# Patient Record
Sex: MALE | Race: WHITE | NOT HISPANIC OR LATINO | Employment: OTHER | URBAN - METROPOLITAN AREA
[De-identification: names, ages, dates, MRNs, and addresses within clinical notes are randomized per-mention and may not be internally consistent; named-entity substitution may affect disease eponyms.]

---

## 2019-10-16 PROBLEM — I49.5 SSS (SICK SINUS SYNDROME): Status: ACTIVE | Noted: 2019-10-16

## 2022-12-14 PROBLEM — Z95.1 HX OF CABG: Status: ACTIVE | Noted: 2022-12-14

## 2022-12-14 PROBLEM — R00.1 BRADYCARDIA: Status: ACTIVE | Noted: 2022-12-14

## 2022-12-14 PROBLEM — I44.1 MOBITZ TYPE 1 SECOND DEGREE AV BLOCK: Status: ACTIVE | Noted: 2022-12-14

## 2022-12-14 PROBLEM — I51.9 LEFT VENTRICULAR SYSTOLIC DYSFUNCTION (LVSD) WITHOUT HEART FAILURE: Status: ACTIVE | Noted: 2022-12-14

## 2022-12-14 PROBLEM — I25.10 CAD IN NATIVE ARTERY: Status: ACTIVE | Noted: 2022-12-14

## 2023-04-17 ENCOUNTER — DOCUMENTATION ONLY (OUTPATIENT)
Dept: CARDIOLOGY | Facility: CLINIC | Age: 73
End: 2023-04-17

## 2023-04-17 NOTE — PROGRESS NOTES
Brief Referral Note:    Ref: Erick Turner    Cardiologist:  [facility]    Reason for referral: Short of breath, second opinion    Brief history:    Hasn't felt good since  his bypass surgery 10 years ago.  For the past few years he has been progressively SOB with less activity.  PPM in situ with recent generator change.  Coronary angiogram in 2022 at [facility].      Email:  [email or web address]    IMP:  WALLS post CABG.  DDX includes CAD, diastolic dysfunction, constriction, other    Rec:  Emailed him to send me records and then we will review them and see him in clinic.  Further evaluation PRN.

## 2023-05-15 ENCOUNTER — OFFICE VISIT (OUTPATIENT)
Dept: CARDIOLOGY | Facility: CLINIC | Age: 73
End: 2023-05-15
Payer: MEDICARE

## 2023-05-15 VITALS
DIASTOLIC BLOOD PRESSURE: 73 MMHG | BODY MASS INDEX: 35.04 KG/M2 | WEIGHT: 236.56 LBS | HEIGHT: 69 IN | HEART RATE: 66 BPM | SYSTOLIC BLOOD PRESSURE: 121 MMHG | OXYGEN SATURATION: 95 %

## 2023-05-15 DIAGNOSIS — I49.5 SSS (SICK SINUS SYNDROME): ICD-10-CM

## 2023-05-15 DIAGNOSIS — I51.9 LEFT VENTRICULAR SYSTOLIC DYSFUNCTION (LVSD) WITHOUT HEART FAILURE: ICD-10-CM

## 2023-05-15 DIAGNOSIS — I25.10 CAD IN NATIVE ARTERY: ICD-10-CM

## 2023-05-15 DIAGNOSIS — Z95.1 HX OF CABG: ICD-10-CM

## 2023-05-15 PROCEDURE — 1126F AMNT PAIN NOTED NONE PRSNT: CPT | Mod: CPTII,S$GLB,, | Performed by: INTERNAL MEDICINE

## 2023-05-15 PROCEDURE — 4010F PR ACE/ARB THEARPY RXD/TAKEN: ICD-10-PCS | Mod: CPTII,S$GLB,, | Performed by: INTERNAL MEDICINE

## 2023-05-15 PROCEDURE — 3074F SYST BP LT 130 MM HG: CPT | Mod: CPTII,S$GLB,, | Performed by: INTERNAL MEDICINE

## 2023-05-15 PROCEDURE — 4010F ACE/ARB THERAPY RXD/TAKEN: CPT | Mod: CPTII,S$GLB,, | Performed by: INTERNAL MEDICINE

## 2023-05-15 PROCEDURE — 3074F PR MOST RECENT SYSTOLIC BLOOD PRESSURE < 130 MM HG: ICD-10-PCS | Mod: CPTII,S$GLB,, | Performed by: INTERNAL MEDICINE

## 2023-05-15 PROCEDURE — 99204 PR OFFICE/OUTPT VISIT, NEW, LEVL IV, 45-59 MIN: ICD-10-PCS | Mod: S$GLB,,, | Performed by: INTERNAL MEDICINE

## 2023-05-15 PROCEDURE — 99999 PR PBB SHADOW E&M-EST. PATIENT-LVL IV: ICD-10-PCS | Mod: PBBFAC,,, | Performed by: INTERNAL MEDICINE

## 2023-05-15 PROCEDURE — 99999 PR PBB SHADOW E&M-EST. PATIENT-LVL IV: CPT | Mod: PBBFAC,,, | Performed by: INTERNAL MEDICINE

## 2023-05-15 PROCEDURE — 3008F BODY MASS INDEX DOCD: CPT | Mod: CPTII,S$GLB,, | Performed by: INTERNAL MEDICINE

## 2023-05-15 PROCEDURE — 1126F PR PAIN SEVERITY QUANTIFIED, NO PAIN PRESENT: ICD-10-PCS | Mod: CPTII,S$GLB,, | Performed by: INTERNAL MEDICINE

## 2023-05-15 PROCEDURE — 1159F MED LIST DOCD IN RCRD: CPT | Mod: CPTII,S$GLB,, | Performed by: INTERNAL MEDICINE

## 2023-05-15 PROCEDURE — 1159F PR MEDICATION LIST DOCUMENTED IN MEDICAL RECORD: ICD-10-PCS | Mod: CPTII,S$GLB,, | Performed by: INTERNAL MEDICINE

## 2023-05-15 PROCEDURE — 3008F PR BODY MASS INDEX (BMI) DOCUMENTED: ICD-10-PCS | Mod: CPTII,S$GLB,, | Performed by: INTERNAL MEDICINE

## 2023-05-15 PROCEDURE — 3078F PR MOST RECENT DIASTOLIC BLOOD PRESSURE < 80 MM HG: ICD-10-PCS | Mod: CPTII,S$GLB,, | Performed by: INTERNAL MEDICINE

## 2023-05-15 PROCEDURE — 3078F DIAST BP <80 MM HG: CPT | Mod: CPTII,S$GLB,, | Performed by: INTERNAL MEDICINE

## 2023-05-15 PROCEDURE — 99204 OFFICE O/P NEW MOD 45 MIN: CPT | Mod: S$GLB,,, | Performed by: INTERNAL MEDICINE

## 2023-05-15 RX ORDER — OLMESARTAN MEDOXOMIL 20 MG/1
20 TABLET ORAL DAILY
Qty: 90 TABLET | Refills: 3 | Status: SHIPPED | OUTPATIENT
Start: 2023-05-15 | End: 2023-05-25 | Stop reason: SDUPTHER

## 2023-05-15 RX ORDER — CLOPIDOGREL BISULFATE 75 MG/1
TABLET ORAL
Qty: 30 TABLET | Refills: 11 | Status: SHIPPED | OUTPATIENT
Start: 2023-05-15 | End: 2023-05-15 | Stop reason: CLARIF

## 2023-05-15 RX ORDER — HYDROCHLOROTHIAZIDE 12.5 MG/1
12.5 TABLET ORAL DAILY
Qty: 30 TABLET | Refills: 11 | Status: ON HOLD | OUTPATIENT
Start: 2023-05-15 | End: 2023-09-05 | Stop reason: HOSPADM
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
## 2023-05-25 DIAGNOSIS — I49.5 SSS (SICK SINUS SYNDROME): Primary | ICD-10-CM

## 2023-05-25 DIAGNOSIS — I25.10 CAD IN NATIVE ARTERY: ICD-10-CM

## 2023-05-25 RX ORDER — OLMESARTAN MEDOXOMIL 20 MG/1
20 TABLET ORAL DAILY
Qty: 90 TABLET | Refills: 3 | Status: ON HOLD | OUTPATIENT
Start: 2023-05-25 | End: 2023-09-05 | Stop reason: HOSPADM

## 2023-06-13 ENCOUNTER — PATIENT MESSAGE (OUTPATIENT)
Dept: CARDIOLOGY | Facility: CLINIC | Age: 73
End: 2023-06-13

## 2023-06-13 ENCOUNTER — TELEPHONE (OUTPATIENT)
Dept: CARDIOLOGY | Facility: CLINIC | Age: 73
End: 2023-06-13

## 2023-06-13 NOTE — TELEPHONE ENCOUNTER
Patient called to report that after three weeks of Hydrochlorothiazide he was advised to stop it by Dr. Molina.  He became very dehydrated, dizzy, experiencing headaches and his BUN/Cr levels increased.  He still c/o SOB with exertion.  He had lab work and echo done last week at [facility] and will have them fax over a copy of results.  He also has blood work scheduled tomorrow and a follow up with  next week.  Will keep virtual visit with Dr. Koch on 6/28 as scheduled.  He did not want to move up appointment due to upcoming labs and Follow up at [facility]>

## 2023-06-14 ENCOUNTER — PATIENT MESSAGE (OUTPATIENT)
Dept: CARDIOLOGY | Facility: CLINIC | Age: 73
End: 2023-06-14

## 2023-06-16 ENCOUNTER — PATIENT MESSAGE (OUTPATIENT)
Dept: CARDIOLOGY | Facility: CLINIC | Age: 73
End: 2023-06-16

## 2023-06-16 ENCOUNTER — EDUCATION (OUTPATIENT)
Dept: CARDIOLOGY | Facility: CLINIC | Age: 73
End: 2023-06-16

## 2023-06-16 ENCOUNTER — DOCUMENTATION ONLY (OUTPATIENT)
Dept: CARDIOLOGY | Facility: CLINIC | Age: 73
End: 2023-06-16

## 2023-06-16 DIAGNOSIS — N18.9 CHRONIC KIDNEY DISEASE, UNSPECIFIED CKD STAGE: ICD-10-CM

## 2023-06-16 DIAGNOSIS — I25.10 CORONARY ARTERY DISEASE INVOLVING NATIVE HEART, UNSPECIFIED VESSEL OR LESION TYPE, UNSPECIFIED WHETHER ANGINA PRESENT: Primary | ICD-10-CM

## 2023-06-16 RX ORDER — PRASUGREL 10 MG/1
TABLET, FILM COATED ORAL
Qty: 30 TABLET | Refills: 11 | Status: ON HOLD | OUTPATIENT
Start: 2023-06-16 | End: 2023-06-30 | Stop reason: HOSPADM

## 2023-06-16 RX ORDER — SODIUM CHLORIDE 9 MG/ML
INJECTION, SOLUTION INTRAVENOUS CONTINUOUS
Status: CANCELLED | OUTPATIENT
Start: 2023-06-16 | End: 2023-06-16

## 2023-06-16 RX ORDER — SODIUM CHLORIDE 0.9 % (FLUSH) 0.9 %
10 SYRINGE (ML) INJECTION
Status: CANCELLED | OUTPATIENT
Start: 2023-06-16

## 2023-06-16 RX ORDER — DIPHENHYDRAMINE HCL 50 MG
50 CAPSULE ORAL ONCE
Status: CANCELLED | OUTPATIENT
Start: 2023-06-16 | End: 2023-06-16

## 2023-06-16 NOTE — PROGRESS NOTES
Patient with incomplete revascularization by CABG with persistent WALLS and chest discomfort despite ARB and wt loss.  I have reviewed his chart and films in [employer] (under his name).  Will bring him in for ostial LAD to very large FSP and diagonal PCI as well as possible OMB and mid and distal LAD PCI.  Needs Prasugrel and ASA.  LV to schedule.  Consents already signed in clinic.

## 2023-06-16 NOTE — PROGRESS NOTES
OUTPATIENT CATHETERIZATION INSTRUCTIONS    You have been scheduled for a procedure in the catheterization lab on Friday, June 30, 2023.     Please report to the Cardiology Waiting Area on the Third floor of the hospital and check in at 6 AM.   You will then be taken to the SSCU (Short Stay Cardiac Unit) and prepared for your procedure. Please be aware that this is not the time of your procedure but the time you are to arrive. The procedures are scheduled on an hourly basis; however, emergency cases take precedence over all other cases.       No solid foods 8 hours prior to your procedure.  You may have clear liquids until the time of your admission which should be 2 hours prior to your procedure.  You are encouraged to drink at least 8 ounces of clear liquids prior to your admission to SSCU.  Patients with gastric emptying issues should be fasting for 6- 8 hours prior to the procedure.  Clear liquids include water, black coffee, clear juices, and performance drinks - no pulp or milk.    Heart failure or dialysis patients will be limited to 8 ounces (1 cup) of clear liquids up until 2 hours of the procedure.    3.   You may take your regular morning medications with water. If there are any medications that you should not take you will be instructed to hold them that morning. If you         are diabetic and on Metformin (Glucophage) do not take it the day before, the day of, and for 2 days after your procedure.  4.   If you are on Pradaxa, Eliquis or Coumadin , you can hold them 3 days prior to your procedure.  Do not stop your Aspirin, Plavix, Effient, or Brilinta.      The procedure will take 1-2 hours to perform. After the procedure, you will return to SSCU on the third floor of the hospital. You will need to lie still (or keep your arm still) for the next 4 to 6 hours to minimize bleeding from the puncture site. Your family may remain in the room with you during this time.       You may be able to be discharged  home that same afternoon if there is someone to drive you home and there were no complications. If you have one of the balloon, stent, or device procedures you may spend the night in the hospital. Your doctor will determine, based on your progress, the date and time of your discharge. The results of your procedure will be discussed with you before you are discharged. Any further testing or procedures will be scheduled for you either before you leave or you will be called with these appointments.       If you should have any questions, concerns, or need to change the date of your procedure, please call  Kelly RN @ [phone]            Special Instructions:  Continue on aspirin  Take 6 Prasugrel tablets(60 mg) the night before then 10 mg daily        THE ABOVE INSTRUCTIONS WERE GIVEN TO THE PATIENT VERBALLY AND THEY VERBALIZED UNDERSTANDING.  THEY DO NOT REQUIRE ANY SPECIAL NEEDS AND DO NOT HAVE ANY LEARNING BARRIERS.          Directions for Reporting to Cardiology Waiting Area in the Hospital  If you park in the Parking Garage:  Take elevators to the1st floor of the parking garage.  Continue past the gift shop, coffee shop, and piano.  Take a right and go to the gold elevators. (Elevator B)  Take the elevator to the 3rd floor.  Follow the arrow on the sign on the wall that says Cath Lab Registration/EP Lab Registration.  Follow the long hallway all the way around until you come to a big open area.  This is the registration area.  Check in at Reception Desk.    OR    If family is dropping you off:  Have them drop you off at the front of the Hospital under the green overhang.  Enter through the doors and take a right.  Take the E elevators to the 3rd floor Cardiology Waiting Area.  Check in at the Reception Desk in the waiting room.

## 2023-06-28 ENCOUNTER — OFFICE VISIT (OUTPATIENT)
Dept: CARDIOLOGY | Facility: CLINIC | Age: 73
End: 2023-06-28
Payer: MEDICARE

## 2023-06-28 VITALS — WEIGHT: 224 LBS | HEIGHT: 69 IN | BODY MASS INDEX: 33.18 KG/M2

## 2023-06-28 DIAGNOSIS — I25.10 CAD IN NATIVE ARTERY: Primary | ICD-10-CM

## 2023-06-28 PROCEDURE — 99214 OFFICE O/P EST MOD 30 MIN: CPT | Mod: 95,,, | Performed by: INTERNAL MEDICINE

## 2023-06-28 PROCEDURE — 1159F PR MEDICATION LIST DOCUMENTED IN MEDICAL RECORD: ICD-10-PCS | Mod: CPTII,95,, | Performed by: INTERNAL MEDICINE

## 2023-06-28 PROCEDURE — 1126F AMNT PAIN NOTED NONE PRSNT: CPT | Mod: CPTII,95,, | Performed by: INTERNAL MEDICINE

## 2023-06-28 PROCEDURE — 99214 PR OFFICE/OUTPT VISIT, EST, LEVL IV, 30-39 MIN: ICD-10-PCS | Mod: 95,,, | Performed by: INTERNAL MEDICINE

## 2023-06-28 PROCEDURE — 1126F PR PAIN SEVERITY QUANTIFIED, NO PAIN PRESENT: ICD-10-PCS | Mod: CPTII,95,, | Performed by: INTERNAL MEDICINE

## 2023-06-28 PROCEDURE — 1159F MED LIST DOCD IN RCRD: CPT | Mod: CPTII,95,, | Performed by: INTERNAL MEDICINE

## 2023-06-28 PROCEDURE — 4010F ACE/ARB THERAPY RXD/TAKEN: CPT | Mod: CPTII,95,, | Performed by: INTERNAL MEDICINE

## 2023-06-28 PROCEDURE — 4010F PR ACE/ARB THEARPY RXD/TAKEN: ICD-10-PCS | Mod: CPTII,95,, | Performed by: INTERNAL MEDICINE

## 2023-06-28 PROCEDURE — 3008F PR BODY MASS INDEX (BMI) DOCUMENTED: ICD-10-PCS | Mod: CPTII,95,, | Performed by: INTERNAL MEDICINE

## 2023-06-28 PROCEDURE — 3008F BODY MASS INDEX DOCD: CPT | Mod: CPTII,95,, | Performed by: INTERNAL MEDICINE

## 2023-06-28 NOTE — PROGRESS NOTES
PCP - David Casey MD  Subjective:   Patient ID:  Rasta Caraballo is a 72 y.o. male who presents for  evaluation of Coronary Artery Disease.     Referring provider: David Casey MD  Primary cardiologist: Devon Molina MD    The following visit is a virtual visit due:  Patient location: Home  Visit type: Virtual visit with realtime audio and video.  Total time spent with patient: 35 min with more than 50% of this time being spent on direct counseling and coordination of care.     Each patient to whom he or she provides medical services by telemedicine is:  (1) informed of the relationship between the physician and patient and the respective role of any other health care provider with respect to management of the patient; and (2) notified that he or she may decline to receive medical services by telemedicine and may withdraw from such care at any time.      HPI: Rasta Caraballo is a 72 y.o. M with CAD s/p CABG in 2013 (LIMA-LAD, SVG-PDA), chronic systolic HF (EF 45% from 60%), HTN, HLD, SSS s/p PPM and now CRT-P upgrade who is referred for evaluation of coronary artery disease.     Patient reports chronic dyspnea on exertion since [place]. Symptoms have been progressively worse. He also reports intermittent chest pressure that resolves with rest. He underwent a stress test (?PET) which was reportedly abnormal - records not available. And subsequently underwent a coronary angiogram in 12/2022 with Dr Molina. This showed patent LIMA-LAD and SVG-PDA. However he has high grade ostial LAD stenosis just before a large diagonal branch that is not bypassed as well as intermediate stenosis of OMB and mid RCA. He was referred to us for second opinion regarding coronary stenting.     Interval History:  Since we last saw him, he is still limited by WALLS and chest pain.  He has been having orthostatic hypotension on olmesartan. Ready for angiography and possible intervention.    History:     Past Medical History:    Diagnosis Date    A-fib     AFTER BYPASS    Arthritis     Bradycardia     Cancer 10/08/2019    prostate    Carotid artery stenosis     CKD (chronic kidney disease) stage 3, GFR 30-59 ml/min     Coronary artery disease     Depression     AFTER BACK SX    Diabetes     pre diabetic    Encounter for blood transfusion     Function kidney decreased     High cholesterol     History of 2019 novel coronavirus disease (COVID-19)     HTN (hypertension)     Irregular heartbeat     MI (myocardial infarction)     Personal history of colonic polyps     Renal disorder      Past Surgical History:   Procedure Laterality Date    COLONOSCOPY W/ BIOPSIES AND POLYPECTOMY      CORONARY ANGIOPLASTY WITH STENT PLACEMENT      Double bypass      Dr Pawel Mcclain    IMPLANTATION OF BIVENTRICULAR HEART PACEMAKER      INSERTION OF BIVENTRICULAR IMPLANTABLE CARDIOVERTER-DEFIBRILLATOR (ICD) N/A 2022    Procedure: INSERTION, ICD, BIVENTRICULAR;  Surgeon: Devon Molina MD;  Location: [facility] CATH;  Service: Cardiology;  Laterality: N/A;    LEFT HEART CATHETERIZATION Left 2022    Procedure: Left heart cath;  Surgeon: Devon Molina MD;  Location: [facility] CATH;  Service: Cardiology;  Laterality: Left;    LUMBAR FUSION      Dr Popeye BARRIENTOS L5-L6    LUMBAR FUSION  2011    L2-L3    PROSTATE BIOPSY      ROTATOR CUFF REPAIR Right 2007    DR Maco Amezquita    ROTATOR CUFF REPAIR Left 2009    TOTAL HIP ARTHROPLASTY Right 2014    TOTAL KNEE ARTHROPLASTY Left      Social History     Tobacco Use    Smoking status: Former     Types: Cigarettes     Quit date: 1991     Years since quittin.4    Smokeless tobacco: Never   Substance Use Topics    Alcohol use: Yes     Alcohol/week: 0.0 standard drinks     Comment: occasionally     Family History   Problem Relation Age of Onset    Diabetes Mother     Heart disease Mother     Cancer Father         stomach & prostate    Heart attack Brother        Meds:   Review of patient's  allergies indicates:  No Known Allergies    Current Outpatient Medications:     acetaminophen (TYLENOL) 500 MG tablet, Take 500 mg by mouth every 6 (six) hours as needed for Pain., Disp: , Rfl:     aspirin 81 MG Chew, Take 81 mg by mouth once daily., Disp: , Rfl:     carvediloL (COREG) 6.25 MG tablet, Take 6.25 mg by mouth 2 (two) times daily with meals., Disp: , Rfl:     cholecalciferol, vitamin D3, (VITAMIN D3) 50 mcg (2,000 unit) Cap capsule, Take 1 capsule by mouth once daily., Disp: , Rfl:     furosemide (LASIX) 20 MG tablet, Take 20 mg by mouth once daily., Disp: , Rfl:     hydroCHLOROthiazide (HYDRODIURIL) 12.5 MG Tab, Take 1 tablet (12.5 mg total) by mouth once daily., Disp: 30 tablet, Rfl: 11    magnesium oxide (MAG-OX) 400 mg (241.3 mg magnesium) tablet, Take 400 mg by mouth once daily., Disp: , Rfl:     metFORMIN (GLUCOPHAGE) 500 MG tablet, Take 1 tablet (500 mg total) by mouth 2 (two) times daily with meals., Disp: , Rfl:     nitroGLYCERIN (NITROSTAT) 0.4 MG SL tablet, Place 0.4 mg under the tongue every 5 (five) minutes as needed., Disp: , Rfl:     olmesartan (BENICAR) 20 MG tablet, Take 1 tablet (20 mg total) by mouth once daily., Disp: 90 tablet, Rfl: 3    pantoprazole (PROTONIX) 40 MG tablet, Take 40 mg by mouth once daily., Disp: , Rfl:     prasugreL (EFFIENT) 10 mg Tab, Take 6 tablets (60 mg) night before procedure then 1 tab (10 mg) daily starting day of the procedure., Disp: 30 tablet, Rfl: 11    rosuvastatin (CRESTOR) 40 MG Tab, Take 1 tablet (40 mg total) by mouth every evening. Patient home dose, Disp: , Rfl:     vardenafil (LEVITRA) 10 MG tablet, Take 10 mg by mouth daily as needed., Disp: , Rfl:     zinc gluconate 50 mg tablet, Take 50 mg by mouth once daily., Disp: , Rfl:     zolpidem (AMBIEN) 10 mg Tab, Take 1 tablet by mouth nightly as needed., Disp: , Rfl: 0      Review of Systems   Constitutional:  Negative for chills, diaphoresis, fever, malaise/fatigue and weight loss.   HENT:  Negative.     Eyes:  Negative for blurred vision, double vision, pain and redness.   Respiratory:  Positive for shortness of breath. Negative for cough and wheezing.    Cardiovascular:  Negative for chest pain, palpitations, orthopnea, claudication, leg swelling and PND.   Gastrointestinal:  Negative for abdominal pain, constipation, diarrhea, nausea and vomiting.   Genitourinary: Negative.    Musculoskeletal: Negative.    Skin: Negative.    Neurological:  Negative for dizziness, focal weakness, seizures, loss of consciousness, weakness and headaches.   Endo/Heme/Allergies: Negative.    Psychiatric/Behavioral:  Negative for depression. The patient is not nervous/anxious.      Objective:   There were no vitals taken for this visit.  Physical Exam  Constitutional:       General: He is not in acute distress.  HENT:      Head: Normocephalic and atraumatic.   Eyes:      Conjunctiva/sclera: Conjunctivae normal.      Pupils: Pupils are equal, round, and reactive to light.   Neck:      Vascular: No JVD.   Cardiovascular:      Rate and Rhythm: Normal rate and regular rhythm.      Pulses:           Radial pulses are 2+ on the right side and 2+ on the left side.      Heart sounds: Normal heart sounds. No murmur heard.    No friction rub. No gallop.   Pulmonary:      Effort: Pulmonary effort is normal. No respiratory distress.      Breath sounds: Normal breath sounds. No wheezing or rales.   Chest:      Chest wall: No tenderness.   Abdominal:      General: Bowel sounds are normal. There is no distension.      Palpations: Abdomen is soft.      Tenderness: There is no abdominal tenderness.   Musculoskeletal:         General: Normal range of motion.      Cervical back: Normal range of motion and neck supple.   Skin:     General: Skin is warm and dry.      Findings: No erythema.   Neurological:      Mental Status: He is alert and oriented to person, place, and time.   Psychiatric:         Mood and Affect: Mood and affect normal.          Cognition and Memory: Memory normal.         Judgment: Judgment normal.       Labs:     Lab Results   Component Value Date     06/24/2009    K 5.1 06/24/2009     06/24/2009    CO2 27 06/24/2009    BUN 24 (H) 06/24/2009    CREATININE 1.2 06/24/2009     No results found for: HGBA1C  No results found for: BNP, BNPTRIAGEBLO    Lab Results   Component Value Date    WBC 6.56 06/24/2009    HGB 14.6 06/24/2009    HCT 44.7 06/24/2009     06/24/2009    GRAN 3.9 06/24/2009    GRAN 59.2 06/24/2009     Lab Results   Component Value Date    CHOL 200 (H) 06/24/2009    HDL 44 06/24/2009    LDLCALC 127.2 06/24/2009    TRIG 144 06/24/2009       Lab Results   Component Value Date     06/24/2009    K 5.1 06/24/2009     06/24/2009    CO2 27 06/24/2009    BUN 24 (H) 06/24/2009    CREATININE 1.2 06/24/2009     No results found for: HGBA1C  No results found for: BNP, BNPTRIAGEBLO Lab Results   Component Value Date    WBC 6.56 06/24/2009    HGB 14.6 06/24/2009    HCT 44.7 06/24/2009     06/24/2009    GRAN 3.9 06/24/2009    GRAN 59.2 06/24/2009     Lab Results   Component Value Date    CHOL 200 (H) 06/24/2009    HDL 44 06/24/2009    LDLCALC 127.2 06/24/2009    TRIG 144 06/24/2009              Assessment & Plan:     1- CAD in native artery with Hx of CABG  - Rasta LORI Caraballo is a 72 y.o. M with CAD s/p CABG in 2013 (LIMA-LAD and SVG-PDA) who is referred for evaluation of chronic dyspnea on exertion. Patient underwent coronary angiogram in 12/2022 that showed patent grafts and high grade ostial LAD stenosis at large diagonal branch that is not bypassed and intermediate mid RCA and OMB stenosis. Recommend optimizing medical therapy and lifestyle modification including weight loss. Started olmesartan a month ago.  He has an angiogram under imaging, xray other, which shows borderline obstructive mid RCA stenosis, ostial LAD stenosis, and proximal OMB1 stenosis which may be contributing to his symptoms.   His PDA and LAD are bypassed but there are some proximal or distal vessels that could benefit from PCI.  His symptoms have persisted despite treatment of metabolic syndrome.  Since his symptoms have not improved, will consider angiography with IFR of the ostial LAD, OMB, and mid RCA.  He also has mild AS/AI.    3- Left ventricular systolic dysfunction (LVSD) without heart failure  - .Continue with GDMT and low sodium diet.     4- Hypertension   - lmesartan 20 mg po daily but didn't tolerate HCTZ. .    5- Hyperlipidemia  - Continue Crestor 40 mg po daily    6- SSS (sick sinus syndrome)  - Patient has hx SSS s/p PPM, recently underwent CRT-P upgrade with Dr Molina     7- Obesity  - There is no height or weight on file to calculate BMI. Weight loss encouraged through diet and exercise    8. VHD.  Bicuspid AV with mild AI and normal EF by echo in [place] in 5/2023.  Needs f/u.  Plan:    Radial or femoral access depending on body habitus Friday.  Load with DAP the night before.  Check PRU Friday AM.  Change DAP PRN.  IFR of the Ostial LAD, OMB, and mid RCA.  Reviewed TTE which he says was sent on June 8: May 2023 normal EF, Bicuspid AV with mild AI on epic under images.  May want to repeat here in the future.     Addendum June 29:  Cines reviewed from 12/22  in [place].  Patient appears to have complex distal LM stenosis involving the ostial LAD and the unprotected LCX.  The LIMA to LAD is patent but doesn't supply proximal LAD, Large FSP, or diagonal.  The mid RCA has a suspicious lesion as well.

## 2023-06-30 ENCOUNTER — HOSPITAL ENCOUNTER (OUTPATIENT)
Facility: HOSPITAL | Age: 73
Discharge: HOME | End: 2023-06-30
Attending: INTERNAL MEDICINE | Admitting: INTERNAL MEDICINE
Payer: MEDICARE

## 2023-06-30 VITALS
TEMPERATURE: 98 F | RESPIRATION RATE: 14 BRPM | OXYGEN SATURATION: 100 % | SYSTOLIC BLOOD PRESSURE: 136 MMHG | HEIGHT: 69 IN | WEIGHT: 225 LBS | DIASTOLIC BLOOD PRESSURE: 60 MMHG | BODY MASS INDEX: 33.33 KG/M2 | HEART RATE: 75 BPM

## 2023-06-30 DIAGNOSIS — I25.10 CORONARY ARTERY DISEASE INVOLVING NATIVE HEART, UNSPECIFIED VESSEL OR LESION TYPE, UNSPECIFIED WHETHER ANGINA PRESENT: ICD-10-CM

## 2023-06-30 DIAGNOSIS — N18.9 CHRONIC KIDNEY DISEASE, UNSPECIFIED CKD STAGE: ICD-10-CM

## 2023-06-30 DIAGNOSIS — I25.10 CAD (CORONARY ARTERY DISEASE): ICD-10-CM

## 2023-06-30 PROBLEM — I25.118 CORONARY ARTERY DISEASE OF NATIVE ARTERY OF NATIVE HEART WITH STABLE ANGINA PECTORIS: Status: ACTIVE | Noted: 2023-06-30

## 2023-06-30 LAB
ABO + RH BLD: NORMAL
ANION GAP SERPL CALC-SCNC: 14 MMOL/L (ref 8–16)
APTT PPP: 26.7 SEC (ref 21–32)
BASOPHILS # BLD AUTO: 0.03 K/UL (ref 0–0.2)
BASOPHILS NFR BLD: 0.3 % (ref 0–1.9)
BLD GP AB SCN CELLS X3 SERPL QL: NORMAL
BUN SERPL-MCNC: 31 MG/DL (ref 8–23)
CALCIUM SERPL-MCNC: 9.9 MG/DL (ref 8.7–10.5)
CHLORIDE SERPL-SCNC: 102 MMOL/L (ref 95–110)
CO2 SERPL-SCNC: 23 MMOL/L (ref 23–29)
CREAT SERPL-MCNC: 1.6 MG/DL (ref 0.5–1.4)
DIFFERENTIAL METHOD: ABNORMAL
EOSINOPHIL # BLD AUTO: 0.1 K/UL (ref 0–0.5)
EOSINOPHIL NFR BLD: 1.3 % (ref 0–8)
ERYTHROCYTE [DISTWIDTH] IN BLOOD BY AUTOMATED COUNT: 18.8 % (ref 11.5–14.5)
EST. GFR  (NO RACE VARIABLE): 45.8 ML/MIN/1.73 M^2
GLUCOSE SERPL-MCNC: 114 MG/DL (ref 70–110)
HCT VFR BLD AUTO: 37.7 % (ref 40–54)
HGB BLD-MCNC: 12 G/DL (ref 14–18)
IMM GRANULOCYTES # BLD AUTO: 0.03 K/UL (ref 0–0.04)
IMM GRANULOCYTES NFR BLD AUTO: 0.3 % (ref 0–0.5)
INR PPP: 1.1 (ref 0.8–1.2)
LYMPHOCYTES # BLD AUTO: 2 K/UL (ref 1–4.8)
LYMPHOCYTES NFR BLD: 20.3 % (ref 18–48)
MCH RBC QN AUTO: 25.2 PG (ref 27–31)
MCHC RBC AUTO-ENTMCNC: 31.8 G/DL (ref 32–36)
MCV RBC AUTO: 79 FL (ref 82–98)
MONOCYTES # BLD AUTO: 0.8 K/UL (ref 0.3–1)
MONOCYTES NFR BLD: 8.7 % (ref 4–15)
NEUTROPHILS # BLD AUTO: 6.7 K/UL (ref 1.8–7.7)
NEUTROPHILS NFR BLD: 69.1 % (ref 38–73)
NRBC BLD-RTO: 0 /100 WBC
PLATELET # BLD AUTO: 258 K/UL (ref 150–450)
PMV BLD AUTO: 10.1 FL (ref 9.2–12.9)
POCT GLUCOSE: 109 MG/DL (ref 70–110)
POTASSIUM SERPL-SCNC: 4.1 MMOL/L (ref 3.5–5.1)
PROTHROMBIN TIME: 11.4 SEC (ref 9–12.5)
RBC # BLD AUTO: 4.76 M/UL (ref 4.6–6.2)
SODIUM SERPL-SCNC: 139 MMOL/L (ref 136–145)
SPECIMEN OUTDATE: NORMAL
WBC # BLD AUTO: 9.67 K/UL (ref 3.9–12.7)

## 2023-06-30 PROCEDURE — 25500020 PHARM REV CODE 255: Performed by: INTERNAL MEDICINE

## 2023-06-30 PROCEDURE — 82962 GLUCOSE BLOOD TEST: CPT | Performed by: INTERNAL MEDICINE

## 2023-06-30 PROCEDURE — 27201423 OPTIME MED/SURG SUP & DEVICES STERILE SUPPLY: Performed by: INTERNAL MEDICINE

## 2023-06-30 PROCEDURE — 36415 COLL VENOUS BLD VENIPUNCTURE: CPT | Performed by: INTERNAL MEDICINE

## 2023-06-30 PROCEDURE — 93010 ELECTROCARDIOGRAM REPORT: CPT | Mod: ,,, | Performed by: INTERNAL MEDICINE

## 2023-06-30 PROCEDURE — 85730 THROMBOPLASTIN TIME PARTIAL: CPT | Performed by: INTERNAL MEDICINE

## 2023-06-30 PROCEDURE — 85025 COMPLETE CBC W/AUTO DIFF WBC: CPT | Performed by: INTERNAL MEDICINE

## 2023-06-30 PROCEDURE — 85610 PROTHROMBIN TIME: CPT | Performed by: INTERNAL MEDICINE

## 2023-06-30 PROCEDURE — 93454 CORONARY ARTERY ANGIO S&I: CPT | Mod: 59 | Performed by: INTERNAL MEDICINE

## 2023-06-30 PROCEDURE — 99152 MOD SED SAME PHYS/QHP 5/>YRS: CPT | Mod: ,,, | Performed by: INTERNAL MEDICINE

## 2023-06-30 PROCEDURE — 93010 EKG 12-LEAD: ICD-10-PCS | Mod: ,,, | Performed by: INTERNAL MEDICINE

## 2023-06-30 PROCEDURE — 99152 MOD SED SAME PHYS/QHP 5/>YRS: CPT | Performed by: INTERNAL MEDICINE

## 2023-06-30 PROCEDURE — C1887 CATHETER, GUIDING: HCPCS | Performed by: INTERNAL MEDICINE

## 2023-06-30 PROCEDURE — 93005 ELECTROCARDIOGRAM TRACING: CPT

## 2023-06-30 PROCEDURE — 92928 PRQ TCAT PLMT NTRAC ST 1 LES: CPT | Mod: LD,,, | Performed by: INTERNAL MEDICINE

## 2023-06-30 PROCEDURE — 93454 CORONARY ARTERY ANGIO S&I: CPT | Mod: 26,59,, | Performed by: INTERNAL MEDICINE

## 2023-06-30 PROCEDURE — C1874 STENT, COATED/COV W/DEL SYS: HCPCS | Performed by: INTERNAL MEDICINE

## 2023-06-30 PROCEDURE — 92928 PR STENT: ICD-10-PCS | Mod: LD,,, | Performed by: INTERNAL MEDICINE

## 2023-06-30 PROCEDURE — C1760 CLOSURE DEV, VASC: HCPCS | Performed by: INTERNAL MEDICINE

## 2023-06-30 PROCEDURE — 86900 BLOOD TYPING SEROLOGIC ABO: CPT | Performed by: INTERNAL MEDICINE

## 2023-06-30 PROCEDURE — C1894 INTRO/SHEATH, NON-LASER: HCPCS | Performed by: INTERNAL MEDICINE

## 2023-06-30 PROCEDURE — 99153 MOD SED SAME PHYS/QHP EA: CPT | Performed by: INTERNAL MEDICINE

## 2023-06-30 PROCEDURE — C1769 GUIDE WIRE: HCPCS | Performed by: INTERNAL MEDICINE

## 2023-06-30 PROCEDURE — C1725 CATH, TRANSLUMIN NON-LASER: HCPCS | Performed by: INTERNAL MEDICINE

## 2023-06-30 PROCEDURE — 99152 PR MOD CONSCIOUS SEDATION, SAME PHYS, 5+ YRS, FIRST 15 MIN: ICD-10-PCS | Mod: ,,, | Performed by: INTERNAL MEDICINE

## 2023-06-30 PROCEDURE — 25000003 PHARM REV CODE 250: Performed by: INTERNAL MEDICINE

## 2023-06-30 PROCEDURE — 80048 BASIC METABOLIC PNL TOTAL CA: CPT | Performed by: INTERNAL MEDICINE

## 2023-06-30 PROCEDURE — 93454 PR CATH PLACE/CORONARY ANGIO, IMG SUPER/INTERP: ICD-10-PCS | Mod: 26,59,, | Performed by: INTERNAL MEDICINE

## 2023-06-30 PROCEDURE — 63600175 PHARM REV CODE 636 W HCPCS: Performed by: INTERNAL MEDICINE

## 2023-06-30 PROCEDURE — C9600 PERC DRUG-EL COR STENT SING: HCPCS | Mod: LD | Performed by: INTERNAL MEDICINE

## 2023-06-30 DEVICE — EVEROLIMUS-ELUTING PLATINUM CHROMIUM CORONARY STENT SYSTEM
Type: IMPLANTABLE DEVICE | Site: HEART | Status: FUNCTIONAL
Brand: SYNERGY™ XD

## 2023-06-30 RX ORDER — ACETAMINOPHEN 325 MG/1
650 TABLET ORAL EVERY 4 HOURS PRN
Status: DISCONTINUED | OUTPATIENT
Start: 2023-06-30 | End: 2023-06-30 | Stop reason: HOSPADM

## 2023-06-30 RX ORDER — SODIUM CHLORIDE 9 MG/ML
INJECTION, SOLUTION INTRAVENOUS CONTINUOUS
Status: ACTIVE | OUTPATIENT
Start: 2023-06-30 | End: 2023-06-30

## 2023-06-30 RX ORDER — PRASUGREL 10 MG/1
10 TABLET, FILM COATED ORAL DAILY
Qty: 30 TABLET | Refills: 11 | Status: SHIPPED | OUTPATIENT
Start: 2023-06-30 | End: 2023-07-05 | Stop reason: SDUPTHER

## 2023-06-30 RX ORDER — MIDAZOLAM HYDROCHLORIDE 1 MG/ML
INJECTION, SOLUTION INTRAMUSCULAR; INTRAVENOUS
Status: DISCONTINUED | OUTPATIENT
Start: 2023-06-30 | End: 2023-06-30 | Stop reason: HOSPADM

## 2023-06-30 RX ORDER — ONDANSETRON 8 MG/1
8 TABLET, ORALLY DISINTEGRATING ORAL EVERY 8 HOURS PRN
Status: DISCONTINUED | OUTPATIENT
Start: 2023-06-30 | End: 2023-06-30 | Stop reason: HOSPADM

## 2023-06-30 RX ORDER — HEPARIN SODIUM 1000 [USP'U]/ML
INJECTION, SOLUTION INTRAVENOUS; SUBCUTANEOUS
Status: DISCONTINUED | OUTPATIENT
Start: 2023-06-30 | End: 2023-06-30 | Stop reason: HOSPADM

## 2023-06-30 RX ORDER — SODIUM CHLORIDE 0.9 % (FLUSH) 0.9 %
10 SYRINGE (ML) INJECTION
Status: DISCONTINUED | OUTPATIENT
Start: 2023-06-30 | End: 2023-06-30 | Stop reason: HOSPADM

## 2023-06-30 RX ORDER — FENTANYL CITRATE 50 UG/ML
INJECTION, SOLUTION INTRAMUSCULAR; INTRAVENOUS
Status: DISCONTINUED | OUTPATIENT
Start: 2023-06-30 | End: 2023-06-30 | Stop reason: HOSPADM

## 2023-06-30 RX ORDER — CEFAZOLIN SODIUM 1 G/3ML
INJECTION, POWDER, FOR SOLUTION INTRAMUSCULAR; INTRAVENOUS
Status: DISCONTINUED | OUTPATIENT
Start: 2023-06-30 | End: 2023-06-30 | Stop reason: HOSPADM

## 2023-06-30 RX ORDER — DIPHENHYDRAMINE HCL 50 MG
50 CAPSULE ORAL ONCE
Status: COMPLETED | OUTPATIENT
Start: 2023-06-30 | End: 2023-06-30

## 2023-06-30 RX ORDER — HEPARIN SOD,PORCINE/0.9 % NACL 1000/500ML
INTRAVENOUS SOLUTION INTRAVENOUS
Status: DISCONTINUED | OUTPATIENT
Start: 2023-06-30 | End: 2023-06-30 | Stop reason: HOSPADM

## 2023-06-30 RX ORDER — LIDOCAINE HYDROCHLORIDE 10 MG/ML
INJECTION INFILTRATION; PERINEURAL
Status: DISCONTINUED | OUTPATIENT
Start: 2023-06-30 | End: 2023-06-30 | Stop reason: HOSPADM

## 2023-06-30 RX ADMIN — SODIUM CHLORIDE: 9 INJECTION, SOLUTION INTRAVENOUS at 07:06

## 2023-06-30 RX ADMIN — DIPHENHYDRAMINE HYDROCHLORIDE 50 MG: 50 CAPSULE ORAL at 07:06

## 2023-06-30 NOTE — HPI
Referring provider: David Casey MD  Primary cardiologist: Devon Molina MD    Rasta Caraballo is a 72 y.o. M with CAD s/p CABG in 2013 (LIMA-LAD, SVG-PDA), chronic systolic HF (EF 45% from 60%), HTN, HLD, SSS s/p PPM and now CRT-P upgrade who presents for [facility] with Dr. Koch     Patient reports chronic dyspnea on exertion since [place]. Symptoms have been progressively worse. He also reports intermittent chest pressure that resolves with rest. He underwent a stress test (?PET) which was reportedly abnormal - records not available. And subsequently underwent a coronary angiogram in 12/2022 with Dr Molina. This showed patent LIMA-LAD and SVG-PDA. However he has high grade ostial LAD stenosis just before a large diagonal branch that is not bypassed as well as intermediate stenosis of OMB and mid RCA. He was referred to us for second opinion regarding coronary stenting.     He has no complaints today.  Stil have T[place] but no active CP at this time.  Denies any CHF or arrhythmia symptoms.  NPO since last night.  Loaded with effient yesterday and took DAPT this AM.

## 2023-06-30 NOTE — NURSING
Received report from Cyndy ZACARIAS RN. Patient s/p [facility], AAOx3. VSS, no c/o pain or discomfort at this time, resp even and unlabored. Gauze/tegaderm dressing to R groin is CDI. No active bleeding. No hematoma noted. Post procedure protocol reviewed with patient. Understanding verbalized. Family members called to bedside. Nurse call bell within reach. Will continue to monitor per post procedure protocol.

## 2023-06-30 NOTE — NURSING
Pt educated on holding pressure if he needs to cough, sneeze or clear throat. Right groin gauze CDI. Small hard hematoma noted. Pt denies any complaints or pain. Femoral pulses 2+, DP pulses 1+, PT pulses 1+. Pressure held for 15 minutes, site ecchymotic, no bleeding noted, groin soft and non-tender. Will continue to monitor, bedrest maintained per orders. Nurse call bell within reach. MD notified awaiting further orders.

## 2023-06-30 NOTE — HOSPITAL COURSE
Pt brought to the cath lab.  R CFA access obtained.  Angiogram performed.  S/p mLAD PCI x1.  Pt tolerated the procedure.  Perclose for hemostasis.  Pt recovered without any issues.  To continue DAPT with ASA and prasugrel.  Subsequently discharged home.

## 2023-06-30 NOTE — Clinical Note
The catheter was repositioned into the ostium   right coronary artery. An angiography was performed of the right coronary arteries. Multiple views were taken. Catheter was removed.

## 2023-06-30 NOTE — ASSESSMENT & PLAN NOTE
angiogram shows borderline obstructive mid RCA stenosis, ostial LAD stenosis, and proximal OMB1 stenosis which may be contributing to his symptoms.  His PDA and LAD are bypassed but there are some proximal or distal vessels that could benefit from PCI.  His symptoms have persisted despite treatment of metabolic syndrome.  Since his symptoms have not improved, perform angiography with IFR of the ostial LAD, OMB, and mid RCA.  - keep NPO  - f/u labs  - to cath lab with Dr. Koch for [facility]  - access: R CFA

## 2023-06-30 NOTE — NURSING
MD Koch at bedside speaking with patient and wife. MD ok with patient getting up to edge of bed to urinate. Patient back to bed, R groin CDI, no hematoma noted, will monitor.

## 2023-06-30 NOTE — BRIEF OP NOTE
Post Cath Note  Preoperative Diagnosis: Coronary artery disease involving native heart, unspecified vessel or lesion type, unspecified whether angina present [I25.10]   Postop Diagnosis: Coronary artery disease involving native heart, unspecified vessel or lesion type, unspecified whether angina present [I25.10]  Referring Physician: Raul Koch     Procedure: Stent, Drug Eluting, Single Vessel, Coronary (N/A), ANGIOGRAM, CORONARY ARTERY (N/A)       Access: Right CFA  : Raul Koch MD   All Operators: Surgeon(s):  MD Raul Burroughs MD Venkat Subramaniam, MD       See full report for further details    Intervention:     - S/P LAD PCI please see full report.      Treatments/Procedures: Procedure(s) (LRB):  Stent, Drug Eluting, Single Vessel, Coronary (N/A)  ANGIOGRAM, CORONARY ARTERY (N/A)     -Closure device: Perclosure    Findings:Severe coronary disease is present. See catheterization report for full details.    Estimated Blood loss: 20 cc    Specimens removed: No  Post Cath Exam:     Vitals:    06/30/23 0710   BP: 126/74   Pulse:    Resp:    Temp:      No unusual pain, hematoma, thrill or bruit at vascular access site.  Distal pulse present without signs of ischemia.    Recommendations:   - Patient tolerated procedure well. No immediate complications  - Routine post-cath care  - Continue aspirin and Effient for 1 year, then aspirin for life  - Routine post cath protocol  - Maximize medical management  - Further care by the primary team  - IVF at  300cc/hr  for 2 hours  - Smoking cessation counseling, Continue medical management, Risk factor reduction, Follow-up with outpatient cardiologist    Rory De La Rosa - Pager# [phone]  6/30/2023  9:21 AM  Cardiovascular Fellow  [facility]

## 2023-06-30 NOTE — SUBJECTIVE & OBJECTIVE
Past Medical History:   Diagnosis Date    A-fib     AFTER BYPASS    Arthritis     Bradycardia     Cancer 10/08/2019    prostate    Carotid artery stenosis     CKD (chronic kidney disease) stage 3, GFR 30-59 ml/min     Coronary artery disease     Depression     AFTER BACK SX    Diabetes     pre diabetic    Encounter for blood transfusion     Function kidney decreased     High cholesterol     History of 2019 novel coronavirus disease (COVID-19) 2020    HTN (hypertension)     Irregular heartbeat     MI (myocardial infarction) 2012    Personal history of colonic polyps     Renal disorder        Past Surgical History:   Procedure Laterality Date    COLONOSCOPY W/ BIOPSIES AND POLYPECTOMY      CORONARY ANGIOPLASTY WITH STENT PLACEMENT      Double bypass  2013    Dr Pawel Mcclain    IMPLANTATION OF BIVENTRICULAR HEART PACEMAKER      INSERTION OF BIVENTRICULAR IMPLANTABLE CARDIOVERTER-DEFIBRILLATOR (ICD) N/A 12/14/2022    Procedure: INSERTION, ICD, BIVENTRICULAR;  Surgeon: Devon Molina MD;  Location: [facility] CATH;  Service: Cardiology;  Laterality: N/A;    LEFT HEART CATHETERIZATION Left 12/14/2022    Procedure: Left heart cath;  Surgeon: Devon Molina MD;  Location: [facility] CATH;  Service: Cardiology;  Laterality: Left;    LUMBAR FUSION  2008    Dr Popeye BARRIENTOS L5-L6    LUMBAR FUSION  2011    L2-L3    PROSTATE BIOPSY      ROTATOR CUFF REPAIR Right 2007    DR Maco Amezquita    ROTATOR CUFF REPAIR Left 2009    TOTAL HIP ARTHROPLASTY Right 2014    TOTAL KNEE ARTHROPLASTY Left        Review of patient's allergies indicates:  No Known Allergies    PTA Medications   Medication Sig    acetaminophen (TYLENOL) 500 MG tablet Take 500 mg by mouth every 6 (six) hours as needed for Pain.    aspirin 81 MG Chew Take 81 mg by mouth once daily.    carvediloL (COREG) 6.25 MG tablet Take 6.25 mg by mouth 2 (two) times daily with meals.    cholecalciferol, vitamin D3, (VITAMIN D3) 50 mcg (2,000 unit) Cap capsule Take 1 capsule by mouth  once daily.    furosemide (LASIX) 20 MG tablet Take 20 mg by mouth once daily.    magnesium oxide (MAG-OX) 400 mg (241.3 mg magnesium) tablet Take 400 mg by mouth once daily.    pantoprazole (PROTONIX) 40 MG tablet Take 40 mg by mouth once daily.    prasugreL (EFFIENT) 10 mg Tab Take 6 tablets (60 mg) night before procedure then 1 tab (10 mg) daily starting day of the procedure.    rosuvastatin (CRESTOR) 40 MG Tab Take 1 tablet (40 mg total) by mouth every evening. Patient home dose    zinc gluconate 50 mg tablet Take 50 mg by mouth once daily.    zolpidem (AMBIEN) 10 mg Tab Take 1 tablet by mouth nightly as needed.    hydroCHLOROthiazide (HYDRODIURIL) 12.5 MG Tab Take 1 tablet (12.5 mg total) by mouth once daily.    metFORMIN (GLUCOPHAGE) 500 MG tablet Take 1 tablet (500 mg total) by mouth 2 (two) times daily with meals.    nitroGLYCERIN (NITROSTAT) 0.4 MG SL tablet Place 0.4 mg under the tongue every 5 (five) minutes as needed.    olmesartan (BENICAR) 20 MG tablet Take 1 tablet (20 mg total) by mouth once daily.    vardenafil (LEVITRA) 10 MG tablet Take 10 mg by mouth daily as needed.     Family History       Problem Relation (Age of Onset)    Cancer Father    Diabetes Mother    Heart attack Brother    Heart disease Mother          Tobacco Use    Smoking status: Former     Types: Cigarettes     Quit date: 1991     Years since quittin.4    Smokeless tobacco: Never   Substance and Sexual Activity    Alcohol use: Yes     Alcohol/week: 0.0 standard drinks     Comment: occasionally    Drug use: Yes     Comment: MED[employer] FOR PAIN MANAGEMENT AND SLEEP    Sexual activity: Not on file     Review of Systems   Constitutional: Negative for fever and malaise/fatigue.   HENT:  Negative for congestion and sore throat.    Eyes:  Negative for blurred vision, vision loss in left eye and vision loss in right eye.   Cardiovascular:  Positive for dyspnea on exertion. Negative for chest pain, irregular heartbeat, leg  swelling, near-syncope, palpitations and syncope.   Respiratory:  Negative for shortness of breath and wheezing.    Endocrine: Negative.    Hematologic/Lymphatic: Negative.    Skin: Negative.    Musculoskeletal:  Negative for arthritis, back pain, joint pain and myalgias.   Gastrointestinal:  Negative for abdominal pain, hematemesis, hematochezia and melena.   Genitourinary: Negative.    Neurological:  Negative for light-headedness and loss of balance.   Psychiatric/Behavioral: Negative.     Objective:     Vital Signs (Most Recent):  Temp: 98.2 °F (36.8 °C) (06/30/23 0708)  Pulse: 71 (06/30/23 0708)  Resp: 18 (06/30/23 0708)  BP: 126/74 (06/30/23 0710)  SpO2: 99 % (06/30/23 0708) Vital Signs (24h Range):  Temp:  [98.2 °F (36.8 °C)] 98.2 °F (36.8 °C)  Pulse:  [71] 71  Resp:  [18] 18  SpO2:  [99 %] 99 %  BP: (121-126)/(65-74) 126/74     Weight: 102.1 kg (225 lb)  Body mass index is 33.23 kg/m².    SpO2: 99 %       No intake or output data in the 24 hours ending 06/30/23 0713    Lines/Drains/Airways       None                    Physical Exam  Vitals and nursing note reviewed.   Constitutional:       Appearance: Normal appearance. He is normal weight. He is not toxic-appearing.   HENT:      Head: Normocephalic and atraumatic.   Eyes:      General: No scleral icterus.     Extraocular Movements: Extraocular movements intact.   Neck:      Vascular: No carotid bruit.   Cardiovascular:      Rate and Rhythm: Normal rate and regular rhythm.      Pulses: Normal pulses.      Heart sounds: Normal heart sounds. No murmur heard.    No gallop.   Pulmonary:      Effort: Pulmonary effort is normal. No respiratory distress.      Breath sounds: Normal breath sounds. No wheezing or rales.   Abdominal:      General: Abdomen is flat. Bowel sounds are normal.      Palpations: Abdomen is soft. There is no mass.   Musculoskeletal:      Cervical back: Normal range of motion.      Right lower leg: No edema.      Left lower leg: No edema.    Skin:     General: Skin is warm and dry.      Capillary Refill: Capillary refill takes less than 2 seconds.      Findings: No rash.   Neurological:      General: No focal deficit present.      Mental Status: He is alert and oriented to person, place, and time. Mental status is at baseline.          Significant Labs: All pertinent lab results from the last 24 hours have been reviewed.    Significant Imaging: Echocardiogram: Transthoracic echo (TTE) complete (Cupid Only): No results found for this or any previous visit.
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
## 2023-06-30 NOTE — DISCHARGE SUMMARY
[facility] - Cath Lab  Interventional Cardiology  Discharge Summary      Patient Name: Rasta Caraballo  MRN: [id number]  Admission Date: 6/30/2023  Hospital Length of Stay: 0 days  Discharge Date and Time:  06/30/2023 9:24 AM  Attending Physician: Raul Koch MD  Discharging Provider: Rory De La Rosa MD  Primary Care Physician: David Casey MD    HPI:  Referring provider: David Casey MD  Primary cardiologist: Devon Molina MD    Rasta Caraballo is a 72 y.o. M with CAD s/p CABG in 2013 (LIMA-LAD, SVG-PDA), chronic systolic HF (EF 45% from 60%), HTN, HLD, SSS s/p PPM and now CRT-P upgrade who presents for [facility] with Dr. Koch     Patient reports chronic dyspnea on exertion since [place]. Symptoms have been progressively worse. He also reports intermittent chest pressure that resolves with rest. He underwent a stress test (?PET) which was reportedly abnormal - records not available. And subsequently underwent a coronary angiogram in 12/2022 with Dr Molina. This showed patent LIMA-LAD and SVG-PDA. However he has high grade ostial LAD stenosis just before a large diagonal branch that is not bypassed as well as intermediate stenosis of OMB and mid RCA. He was referred to us for second opinion regarding coronary stenting.     He has no complaints today.  Stil have Tabor but no active CP at this time.  Denies any CHF or arrhythmia symptoms.  NPO since last night.  Loaded with effient yesterday and took DAPT this AM.           Procedure(s) (LRB):  Stent, Drug Eluting, Single Vessel, Coronary (N/A)  ANGIOGRAM, CORONARY ARTERY (N/A)     Indwelling Lines/Drains at time of discharge:  Lines/Drains/Airways     None                 Hospital Course:  Pt brought to the cath lab.  R CFA access obtained.  Angiogram performed.  S/p mLAD PCI x1.  Pt tolerated the procedure.  Perclose for hemostasis.  Pt recovered without any issues.  To continue DAPT with ASA and prasugrel.  Subsequently discharged home.      Goals of Care  Treatment Preferences:  Code Status: Full Code          Significant Diagnostic Studies: Angiography: see cath report    Pending Diagnostic Studies:     None        No new Assessment & Plan notes have been filed under this hospital service since the last note was generated.  Service: Interventional Cardiology      Discharged Condition: good    Follow Up:    Patient Instructions:   No discharge procedures on file.  Medications:  Reconciled Home Medications:      Medication List      CHANGE how you take these medications    prasugreL 10 mg Tab  Commonly known as: EFFIENT  Take 1 tablet (10 mg total) by mouth once daily.  What changed:   · how much to take  · how to take this  · when to take this  · additional instructions        CONTINUE taking these medications    acetaminophen 500 MG tablet  Commonly known as: TYLENOL  Take 500 mg by mouth every 6 (six) hours as needed for Pain.     aspirin 81 MG Chew  Take 81 mg by mouth once daily.     carvediloL 6.25 MG tablet  Commonly known as: COREG  Take 6.25 mg by mouth 2 (two) times daily with meals.     cholecalciferol (vitamin D3) 50 mcg (2,000 unit) Cap capsule  Commonly known as: VITAMIN D3  Take 1 capsule by mouth once daily.     furosemide 20 MG tablet  Commonly known as: LASIX  Take 20 mg by mouth once daily.     magnesium oxide 400 mg (241.3 mg magnesium) tablet  Commonly known as: MAG-OX  Take 400 mg by mouth once daily.     metFORMIN 500 MG tablet  Commonly known as: GLUCOPHAGE  Take 1 tablet (500 mg total) by mouth 2 (two) times daily with meals.     nitroGLYCERIN 0.4 MG SL tablet  Commonly known as: NITROSTAT  Place 0.4 mg under the tongue every 5 (five) minutes as needed.     olmesartan 20 MG tablet  Commonly known as: BENICAR  Take 1 tablet (20 mg total) by mouth once daily.     pantoprazole 40 MG tablet  Commonly known as: PROTONIX  Take 40 mg by mouth once daily.     rosuvastatin 40 MG Tab  Commonly known as: CRESTOR  Take 1 tablet (40 mg total) by mouth  every evening. Patient home dose     zinc gluconate 50 mg tablet  Take 50 mg by mouth once daily.     zolpidem 10 mg Tab  Commonly known as: AMBIEN  Take 1 tablet by mouth nightly as needed.        STOP taking these medications    vardenafiL 10 MG tablet  Commonly known as: LEVITRA        ASK your doctor about these medications    hydroCHLOROthiazide 12.5 MG Tab  Commonly known as: HYDRODIURIL  Take 1 tablet (12.5 mg total) by mouth once daily.            Time spent on the discharge of patient: 31 minutes    Rory De La Rosa MD  Interventional Cardiology  [facility] - Cath Lab

## 2023-06-30 NOTE — PLAN OF CARE
Patient arrived to room. PIV placed. Admit assessment completed. Plan of care discussed with patient

## 2023-07-06 RX ORDER — PRASUGREL 10 MG/1
10 TABLET, FILM COATED ORAL DAILY
Qty: 30 TABLET | Refills: 11 | Status: SHIPPED | OUTPATIENT
Start: 2023-07-06 | End: 2024-07-05

## 2023-07-20 ENCOUNTER — PATIENT MESSAGE (OUTPATIENT)
Dept: CARDIOLOGY | Facility: CLINIC | Age: 73
End: 2023-07-20

## 2023-09-05 PROBLEM — I35.0 AORTIC STENOSIS: Status: ACTIVE | Noted: 2023-09-05

## 2023-09-05 PROBLEM — I50.32 CHRONIC DIASTOLIC HEART FAILURE: Status: ACTIVE | Noted: 2023-09-05

## 2023-09-05 PROBLEM — I34.0 NONRHEUMATIC MITRAL VALVE REGURGITATION: Status: ACTIVE | Noted: 2023-09-05

## 2023-10-06 DIAGNOSIS — I35.0 AORTIC VALVE STENOSIS, ETIOLOGY OF CARDIAC VALVE DISEASE UNSPECIFIED: Primary | ICD-10-CM

## 2024-01-16 PROBLEM — M17.11 OSTEOARTHRITIS OF RIGHT KNEE: Status: ACTIVE | Noted: 2024-01-16

## 2024-01-26 PROBLEM — E11.9 TYPE 2 DIABETES MELLITUS WITHOUT COMPLICATION, WITHOUT LONG-TERM CURRENT USE OF INSULIN: Status: ACTIVE | Noted: 2024-01-26

## (undated) DEVICE — GUIDE VISTA XB 3.5

## (undated) DEVICE — DEVICE PERCLOSE SUT CLSR 6FR

## (undated) DEVICE — TRANSDUCER ADULT DISP

## (undated) DEVICE — EVEROLIMUS-ELUTING PLATINUM CHROMIUM CORONARY STENT SYSTEM
Type: IMPLANTABLE DEVICE | Site: HEART | Status: NON-FUNCTIONAL
Brand: SYNERGY™ XD
Removed: 2023-06-30

## (undated) DEVICE — GUIDE WIRE BMW 014 X190

## (undated) DEVICE — OMNIPAQUE 350 200ML

## (undated) DEVICE — GUIDE VISTA 6FR JR 4

## (undated) DEVICE — SHEATH INTRODUCER 6FR 11CM

## (undated) DEVICE — TRAY CATH LAB OMC

## (undated) DEVICE — CATH GUIDE LINER  V3 6F

## (undated) DEVICE — KIT CUSTOM MANIFOLD

## (undated) DEVICE — SPIKE CONTRAST CONTROLLER

## (undated) DEVICE — INFLATOR ENCORE 26 BLLN INFL

## (undated) DEVICE — GUIDEWIRE SUPRA CORE 035 190CM

## (undated) DEVICE — CATH NC EMERGE MR 3X8MM

## (undated) DEVICE — GUIDEWIRE OMNI J TIP 185CM

## (undated) DEVICE — KIT COPILOT VALVE HEMO TOOL

## (undated) DEVICE — KIT MICROINTRO 4F .018X40X7CM